# Patient Record
Sex: FEMALE | Race: WHITE | Employment: OTHER | URBAN - METROPOLITAN AREA
[De-identification: names, ages, dates, MRNs, and addresses within clinical notes are randomized per-mention and may not be internally consistent; named-entity substitution may affect disease eponyms.]

---

## 2020-10-10 VITALS — HEIGHT: 67 IN

## 2020-10-10 RX ORDER — ASPIRIN 325 MG
325 TABLET ORAL DAILY
COMMUNITY
End: 2020-12-02

## 2020-10-10 RX ORDER — ATORVASTATIN CALCIUM 40 MG/1
40 TABLET, FILM COATED ORAL DAILY
COMMUNITY

## 2020-10-10 RX ORDER — LORATADINE/PSEUDOEPHEDRINE 10MG-240MG
1 TABLET, EXTENDED RELEASE 24 HR ORAL DAILY
COMMUNITY
End: 2020-12-02

## 2020-10-10 RX ORDER — OXYCODONE HYDROCHLORIDE AND ACETAMINOPHEN 5; 325 MG/1; MG/1
1 TABLET ORAL EVERY 4 HOURS PRN
COMMUNITY
End: 2020-12-02

## 2020-12-02 ENCOUNTER — HOSPITAL ENCOUNTER (OUTPATIENT)
Age: 75
Setting detail: SPECIMEN
Discharge: HOME | End: 2020-12-02
Payer: MEDICARE

## 2020-12-02 ENCOUNTER — OFFICE VISIT (OUTPATIENT)
Dept: OBGYN CLINIC | Age: 75
End: 2020-12-02
Payer: MEDICARE

## 2020-12-02 VITALS
WEIGHT: 187 LBS | SYSTOLIC BLOOD PRESSURE: 136 MMHG | BODY MASS INDEX: 29.35 KG/M2 | HEIGHT: 67 IN | DIASTOLIC BLOOD PRESSURE: 66 MMHG

## 2020-12-02 PROCEDURE — G0101 CA SCREEN;PELVIC/BREAST EXAM: HCPCS | Performed by: OBSTETRICS & GYNECOLOGY

## 2020-12-02 RX ORDER — ASPIRIN 81 MG/1
81 TABLET ORAL DAILY
COMMUNITY

## 2020-12-02 RX ORDER — LOSARTAN POTASSIUM AND HYDROCHLOROTHIAZIDE 12.5; 1 MG/1; MG/1
TABLET ORAL
COMMUNITY
Start: 2020-09-11

## 2020-12-02 ASSESSMENT — ENCOUNTER SYMPTOMS
CONSTIPATION: 0
DIARRHEA: 0
ABDOMINAL PAIN: 0
SHORTNESS OF BREATH: 0

## 2020-12-02 NOTE — LETTER
[address] [place] [address]. [facility] [address]  Phone: [phone]  Fax: [phone]            December 14, 2020    [place] [id number]      Dear Jaden Garcia: The results of your most recent Pap smear are normal. This means that no cancerous or precancerous cells were seen. We recommend that you come back in 1 year for your next routine Pap smear. If you have any questions or concerns, please don't hesitate to call.     Sincerely,        Dr. Lanny Maciel

## 2020-12-02 NOTE — PROGRESS NOTES
DATE OF VISIT:  20  PATIENT NAME:  Zhanna Schmitz     YOB: 1945    67 y.o. Chief Complaint   Patient presents with    New Patient    Gynecologic Exam     Pt. denies concerns. Last mamm was 2 wks ago. Colonoscopy . Bone density was at least 3 years ago. Pt. believes she still has one ovary. No LMP recorded. Patient is postmenopausal.           Primary Care Physician: Elder Casey MD    The patient was seen and examined. She has no chiefcomplaint today and is here for her annual exam.  Her bowels are regular. There are no voiding complaints. She denies any bloating. She denies vaginal discharge and was counseled on STD's and the need for barriercontraception.      HPI : Zhanna Schmitz is a 67 y.o. female  who presents today for her annual.    ______________________________________________________________________    OB History    Para Term  AB Living   3 3       3   SAB TAB Ectopic Molar Multiple Live Births             3      # Outcome Date GA Lbr Declan/2nd Weight Sex Delivery Anes PTL Lv   3 Para            2 Para            1 Para              Past Medical History:   Diagnosis Date    Arthritis     Heart disease     Hypertension     Shingles     Thyroid disease                                                                    Past Surgical History:   Procedure Laterality Date    CARPAL TUNNEL RELEASE      CHOLECYSTECTOMY      COLONOSCOPY  2009    CORONARY ANGIOPLASTY WITH STENT PLACEMENT  2013    JOINT REPLACEMENT Left 2017    JOINT REPLACEMENT Right 2008    KNEE ARTHROSCOPY Left 2015    SALPINGO-OOPHORECTOMY  10/2012    TUBAL LIGATION Bilateral      Family History   Problem Relation Age of Onset    Hypertension Mother     Hypertension Father     Breast Cancer Niece      Social History     Socioeconomic History    Marital status: Single     Spouse name: Not on file    Number of children: Not on file    Years of education: Not on file    Highest education level: Not on file   Occupational History    Not on file   Social Needs    Financial resource strain: Not on file    Food insecurity     Worry: Not on file     Inability: Not on file    Transportation needs     Medical: Not on file     Non-medical: Not on file   Tobacco Use    Smoking status: Never Smoker    Smokeless tobacco: Never Used   Substance and Sexual Activity    Alcohol use: Yes     Comment: socially    Drug use: Never    Sexual activity: Not Currently   Lifestyle    Physical activity     Days per week: Not on file     Minutes per session: Not on file    Stress: Not on file   Relationships    Social connections     Talks on phone: Not on file     Gets together: Not on file     Attends Judaism service: Not on file     Active member of club or organization: Not on file     Attends meetings of clubs or organizations: Not on file     Relationship status: Not on file    Intimate partner violence     Fear of current or ex partner: Not on file     Emotionally abused: Not on file     Physically abused: Not on file     Forced sexual activity: Not on file   Other Topics Concern    Not on file   Social History Narrative    Not on file     Vitals:    12/02/20 1313   BP: 136/66   Site: Right Upper Arm   Position: Sitting   Weight: 187 lb (84.8 kg)   Height: 5' 7\" (1.702 m)     Body mass index is 29.29 kg/m². No LMP recorded.  Patient is postmenopausal.    MEDICATIONS:  Current Outpatient Medications   Medication Sig Dispense Refill    aspirin 81 MG EC tablet Take 81 mg by mouth daily      Multiple Vitamins-Minerals (PRESERVISION/LUTEIN PO) PreserVision AREDS 14,320-226-200 unit-mg-unit oral capsule PreserVision AREDS 14,320-226-200 unit-mg-unit oral capsule take 1 capsule by oral route 2 times a day   [facility] ([id number])      losartan-hydroCHLOROthiazide (HYZAAR) 100-12.5 MG per tablet       atorvastatin (LIPITOR) 40 MG tablet Take 40 mg by mouth daily      CALCIUM-VITAMIN D PO Take by mouth      LEVOTHYROXINE SODIUM PO Take by mouth       No current facility-administered medications for this visit. ALLERGIES:  Allergies as of 12/02/2020 - Review Complete 12/02/2020   Allergen Reaction Noted    Codeine Nausea Only 10/10/2020           Symptoms of decreased mood absent    **If either question is answered in a  positive fashion then completethe PHQ9 Scoring Evaluation and make the appropriate referral**      Gynecologic History:       No LMP recorded. Patient is postmenopausal.    Sexually Active: No    STD History: No     Permanent Sterilization:Yes    Reversible Birth Control: No        Hormone Replacement Exposure: No      Genetic Qualified Family History of Breast, Ovarian , Colon or Uterine Cancer:No   If YES see scanned worksheet. Preventative Health Testing:  Colposcopy History:   Date of Last Mammogram: 2020  Date of Last Colonoscopy:   Date of Last Bone Density:      ______________________________________________________________________    REVIEW OF SYSTEMS:       Review of Systems   Constitutional: Negative for chills, fatigue and fever. Respiratory: Negative for shortness of breath. Cardiovascular: Negative for chest pain. Gastrointestinal: Negative for abdominal pain, constipation and diarrhea. Genitourinary: Negative for dysuria, enuresis, frequency, menstrual problem, pelvic pain, urgency and vaginal bleeding. Neurological: Negative for dizziness, light-headedness and headaches. PHYSICAL EXAM:    Physical Exam  Constitutional:       Appearance: Normal appearance. Genitourinary:      Pelvic exam was performed with patient in the lithotomy position. Vulva, vagina, cervix, uterus, right adnexa and left adnexa normal.   HENT:      Head: Atraumatic. Mouth/Throat:      Mouth: Mucous membranes are moist.   Eyes:      Extraocular Movements: Extraocular movements intact.       Pupils: Pupils are equal, round,

## 2020-12-08 LAB
HPV SOURCE: NORMAL
HPV, GENOTYPE 16: NOT DETECTED
HPV, GENOTYPE 18: NOT DETECTED
HPV, HIGH RISK OTHER: NOT DETECTED

## 2020-12-10 LAB — CYTOLOGY REPORT: NORMAL

## 2023-02-22 ENCOUNTER — OFFICE VISIT (OUTPATIENT)
Dept: OBGYN CLINIC | Age: 78
End: 2023-02-22
Payer: MEDICARE

## 2023-02-22 ENCOUNTER — TELEPHONE (OUTPATIENT)
Dept: OBGYN CLINIC | Age: 78
End: 2023-02-22

## 2023-02-22 VITALS
WEIGHT: 175 LBS | BODY MASS INDEX: 26.52 KG/M2 | DIASTOLIC BLOOD PRESSURE: 70 MMHG | SYSTOLIC BLOOD PRESSURE: 144 MMHG | HEIGHT: 68 IN

## 2023-02-22 DIAGNOSIS — Z01.419 WOMEN'S ANNUAL ROUTINE GYNECOLOGICAL EXAMINATION: Primary | ICD-10-CM

## 2023-02-22 DIAGNOSIS — Z78.0 ENCOUNTER FOR OSTEOPOROSIS SCREENING IN ASYMPTOMATIC POSTMENOPAUSAL PATIENT: ICD-10-CM

## 2023-02-22 DIAGNOSIS — Z13.820 ENCOUNTER FOR OSTEOPOROSIS SCREENING IN ASYMPTOMATIC POSTMENOPAUSAL PATIENT: ICD-10-CM

## 2023-02-22 PROCEDURE — G0101 CA SCREEN;PELVIC/BREAST EXAM: HCPCS | Performed by: OBSTETRICS & GYNECOLOGY

## 2023-02-22 RX ORDER — LEVOTHYROXINE SODIUM 0.07 MG/1
TABLET ORAL
COMMUNITY
Start: 2022-12-20

## 2023-02-22 ASSESSMENT — ENCOUNTER SYMPTOMS
SHORTNESS OF BREATH: 0
DIARRHEA: 0
CONSTIPATION: 0
ABDOMINAL PAIN: 0

## 2023-02-22 NOTE — PROGRESS NOTES
YEARLY PHYSICAL    Date of service: 2023    Rachana Swenson  Is a 78 y.o. female    PT's PCP is: David Fleming MD     : 1945                                         Chaperone for Intimate Exam  Chaperone was offered as part of the rooming process. Patient declined and agrees to continue with exam without a chaperone.  Chaperone: N/A      Subjective:       No LMP recorded. Patient is postmenopausal.     Are your menses regular: not applicable    OB History    Para Term  AB Living   3 3       3   SAB IAB Ectopic Molar Multiple Live Births             3      # Outcome Date GA Lbr Declan/2nd Weight Sex Delivery Anes PTL Lv   3 Para            2 Para            1 Para                 Social History     Tobacco Use   Smoking Status Never   Smokeless Tobacco Never        Social History     Substance and Sexual Activity   Alcohol Use Yes    Comment: socially       Family History   Problem Relation Age of Onset    Hypertension Mother     Hypertension Father     Heart Disease Father     Breast Cancer Niece        Any family history of breast or ovarian cancer: Yes-niece breast cancer    Any family history of blood clots: No      Allergies: Codeine      Current Outpatient Medications:     aspirin 81 MG EC tablet, Take 81 mg by mouth daily, Disp: , Rfl:     Multiple Vitamins-Minerals (PRESERVISION/LUTEIN PO), PreserVision AREDS 14,320-226-200 unit-mg-unit oral capsule PreserVision AREDS 14,320-226-200 unit-mg-unit oral capsule take 1 capsule by oral route 2 times a day   [facility] ([id number]), Disp: , Rfl:     losartan-hydroCHLOROthiazide (HYZAAR) 100-12.5 MG per tablet, , Disp: , Rfl:     atorvastatin (LIPITOR) 40 MG tablet, Take 40 mg by mouth daily, Disp: , Rfl:     CALCIUM-VITAMIN D PO, Take by mouth, Disp: , Rfl:     levothyroxine (SYNTHROID) 75 MCG tablet, , Disp: , Rfl:     Social History     Substance  and Sexual Activity   Sexual Activity Not Currently       Any bleeding or pain with intercourse: not sexually active    Last Yearly:  12-2-2020    Last pap: 12-2-2020 NL    Last HPV: 12-2-2020 NEG    Last Mammogram: 11-    Last Dexascan     Last colorectal screen- type:colonoscopy  date  2019- due in 2024    Do you do self breast exams: Yes    Past Medical History:   Diagnosis Date    Arthritis     Diabetes ([address]     Heart disease     Hypertension     Shingles     Thyroid disease        Past Surgical History:   Procedure Laterality Date    CARPAL TUNNEL RELEASE      CHOLECYSTECTOMY  1977    COLONOSCOPY  2009    CORONARY ANGIOPLASTY WITH STENT PLACEMENT  2013    JOINT REPLACEMENT Left 2017    JOINT REPLACEMENT Right 2008    KNEE ARTHROSCOPY Left 2015    SALPINGO-OOPHORECTOMY  10/2012    TUBAL LIGATION Bilateral 1980       Family History   Problem Relation Age of Onset    Hypertension Mother     Hypertension Father     Heart Disease Father     Breast Cancer Niece        Chief Complaint   Patient presents with    Annual Exam     Patient denies concerns. Patient has been diagnosed as diabetic but is able to manage with diet/exercise at this time. PE:  Vital Signs  Blood pressure (!) 144/70, height 5' 7.5\" (1.715 m), weight 175 lb (79.4 kg). Estimated body mass index is 27 kg/m² as calculated from the following:    Height as of this encounter: 5' 7.5\" (1.715 m). Weight as of this encounter: 175 lb (79.4 kg). NURSE: Jimbo Hurd    HPI: Patient presents today for annual exam. Feeling well, voices no concerns. Denies breast/pelvic pain. Negative pap. Mammogram. Wellness reviewed. Reports monthly menses. Review of Systems   Constitutional:  Negative for chills, fatigue and fever. Respiratory:  Negative for shortness of breath. Cardiovascular:  Negative for chest pain. Gastrointestinal:  Negative for abdominal pain, constipation and diarrhea.    Genitourinary:  Negative for dysuria, enuresis, frequency, menstrual problem, pelvic pain, urgency and vaginal bleeding. Neurological:  Negative for dizziness, light-headedness and headaches. Physical Exam  Constitutional:       Appearance: Normal appearance. Genitourinary:      Vulva, bladder and urethral meatus normal.      Right Labia: No rash or lesions. Left Labia: No lesions or rash. No labial fusion noted. No vaginal discharge. No vaginal prolapse present. Moderate vaginal atrophy present. Right Adnexa: not tender and no mass present. Left Adnexa: not tender and no mass present. No cervical motion tenderness, discharge, friability or lesion. No parametrium nodularity present. Uterus is not enlarged or tender. Breasts:     Breasts are soft. Right: No inverted nipple, mass, nipple discharge, skin change or tenderness. Left: No inverted nipple, mass, nipple discharge, skin change or tenderness. HENT:      Head: Normocephalic and atraumatic. Eyes:      Extraocular Movements: Extraocular movements intact. Pupils: Pupils are equal, round, and reactive to light. Cardiovascular:      Rate and Rhythm: Normal rate. Pulmonary:      Effort: Pulmonary effort is normal.   Abdominal:      General: There is no distension. Palpations: Abdomen is soft. There is no mass. Tenderness: There is no abdominal tenderness. Musculoskeletal:         General: Normal range of motion. Cervical back: Normal range of motion. Neurological:      General: No focal deficit present. Mental Status: She is alert and oriented to person, place, and time. Skin:     General: Skin is warm and dry. Psychiatric:         Mood and Affect: Mood normal.         Behavior: Behavior normal.         Thought Content: Thought content normal.         Judgment: Judgment normal.                           Assessment & Plan  1.  Women's annual routine gynecological examination      Repeat Annual every 1 year  Cervical Cytology Evaluation begins at 24years old. If Negative Cytology, Follow-up screening per current guidelines. Mammograms every 1year. If 35 yo and last mammogram was negative. Routine healthmaintenance per patients PCP. Return if symptoms worsen or fail to improve.        Electronically Signed by Kiki Kamara DO

## 2023-02-22 NOTE — TELEPHONE ENCOUNTER
Pt called office after her appt today and wanted to make sure her dexa scan order gets sent to [facility]. Could you check and make sure that gets sent please and thank you.

## 2024-12-16 LAB — MAMMOGRAPHY, EXTERNAL: NORMAL
